# Patient Record
Sex: FEMALE | Race: AMERICAN INDIAN OR ALASKA NATIVE
[De-identification: names, ages, dates, MRNs, and addresses within clinical notes are randomized per-mention and may not be internally consistent; named-entity substitution may affect disease eponyms.]

---

## 2021-10-21 ENCOUNTER — HOSPITAL ENCOUNTER (EMERGENCY)
Dept: HOSPITAL 5 - ED | Age: 31
Discharge: HOME | End: 2021-10-21
Payer: COMMERCIAL

## 2021-10-21 VITALS — SYSTOLIC BLOOD PRESSURE: 133 MMHG | DIASTOLIC BLOOD PRESSURE: 75 MMHG

## 2021-10-21 DIAGNOSIS — Z98.890: ICD-10-CM

## 2021-10-21 DIAGNOSIS — Z3A.10: ICD-10-CM

## 2021-10-21 DIAGNOSIS — O23.41: ICD-10-CM

## 2021-10-21 DIAGNOSIS — O20.9: Primary | ICD-10-CM

## 2021-10-21 LAB
ALBUMIN SERPL-MCNC: 4.1 G/DL (ref 3.9–5)
ALT SERPL-CCNC: 9 UNITS/L (ref 7–56)
BACTERIA #/AREA URNS HPF: (no result) /HPF
BASOPHILS # (AUTO): 0 K/MM3 (ref 0–0.1)
BASOPHILS NFR BLD AUTO: 0.4 % (ref 0–1.8)
BILIRUB UR QL STRIP: (no result)
BLOOD UR QL VISUAL: (no result)
BUN SERPL-MCNC: 5 MG/DL (ref 7–17)
BUN/CREAT SERPL: 8 %
CALCIUM SERPL-MCNC: 9.4 MG/DL (ref 8.4–10.2)
EOSINOPHIL # BLD AUTO: 0.1 K/MM3 (ref 0–0.4)
EOSINOPHIL NFR BLD AUTO: 1.6 % (ref 0–4.3)
HCT VFR BLD CALC: 37.2 % (ref 30.3–42.9)
HEMOLYSIS INDEX: 5
HGB BLD-MCNC: 12.7 GM/DL (ref 10.1–14.3)
LYMPHOCYTES # BLD AUTO: 1.6 K/MM3 (ref 1.2–5.4)
LYMPHOCYTES NFR BLD AUTO: 19.9 % (ref 13.4–35)
MCHC RBC AUTO-ENTMCNC: 34 % (ref 30–34)
MCV RBC AUTO: 84 FL (ref 79–97)
MONOCYTES # (AUTO): 0.7 K/MM3 (ref 0–0.8)
MONOCYTES % (AUTO): 8.6 % (ref 0–7.3)
MUCOUS THREADS #/AREA URNS HPF: (no result) /HPF
PH UR STRIP: 5 [PH] (ref 5–7)
PLATELET # BLD: 351 K/MM3 (ref 140–440)
RBC # BLD AUTO: 4.44 M/MM3 (ref 3.65–5.03)
RBC #/AREA URNS HPF: 6 /HPF (ref 0–6)
UROBILINOGEN UR-MCNC: 4 MG/DL (ref ?–2)
WBC #/AREA URNS HPF: 27 /HPF (ref 0–6)

## 2021-10-21 PROCEDURE — 86901 BLOOD TYPING SEROLOGIC RH(D): CPT

## 2021-10-21 PROCEDURE — 84702 CHORIONIC GONADOTROPIN TEST: CPT

## 2021-10-21 PROCEDURE — 76801 OB US < 14 WKS SINGLE FETUS: CPT

## 2021-10-21 PROCEDURE — 81001 URINALYSIS AUTO W/SCOPE: CPT

## 2021-10-21 PROCEDURE — 36415 COLL VENOUS BLD VENIPUNCTURE: CPT

## 2021-10-21 PROCEDURE — 85025 COMPLETE CBC W/AUTO DIFF WBC: CPT

## 2021-10-21 PROCEDURE — 87086 URINE CULTURE/COLONY COUNT: CPT

## 2021-10-21 PROCEDURE — 99284 EMERGENCY DEPT VISIT MOD MDM: CPT

## 2021-10-21 PROCEDURE — 86900 BLOOD TYPING SEROLOGIC ABO: CPT

## 2021-10-21 PROCEDURE — 80053 COMPREHEN METABOLIC PANEL: CPT

## 2021-10-21 NOTE — EMERGENCY DEPARTMENT REPORT
ED General Adult HPI





- General


Chief complaint: Vaginal Bleeding


Stated complaint: 10WKS PREGNANT BLEEEDING


Time Seen by Provider: 10/21/21 10:46


Source: patient


Mode of arrival: Ambulatory


Limitations: No Limitations





- History of Present Illness


Initial comments: 





31-year-old -American female patient presents with bleeding during 

pregnancy today.  She states she noticed vaginal bleeding around 2 AM this 

morning that was very light and spotty.  She has not used a full pad today.  She

denies any abdominal pain, dysuria/hematuria/urinary frequency, vaginal 

discharge/dyspareunia, fever/chills/sweats, or stool changes.  She is G2, .

 She reports her first OB/GYN appointment is Wednesday next week.  Patient 

states she is otherwise feeling well.  No other past medical history.





- Related Data


                                Home Medications











 Medication  Instructions  Recorded  Confirmed  Last Taken


 


Prenatal Vit-Fe Fumar-FA [Prenatal 1 tab PO QDAY 21 Unknown





Vitamin]    








                                  Previous Rx's











 Medication  Instructions  Recorded  Last Taken  Type


 


HYDROcodone/APAP 5-325 [[place] 1 each PO Q6HR PRN #15 tablet 21 Unknown Rx





5/325]    


 


Ibuprofen [Motrin] 800 mg PO Q8HR PRN #40 tablet 21 Unknown Rx


 


Amoxicillin/Potassium Clav 1 each PO BID 5 Days #10 tablet 10/21/21 Unknown Rx





[Augmentin 875-125 Tablet]    











                                    Allergies











Allergy/AdvReac Type Severity Reaction Status Date / Time


 


No Known Allergies Allergy   Verified 10/21/21 10:27














ED Review of Systems


ROS: 


Stated complaint: 10WKS PREGNANT BLEEEDING


Other details as noted in HPI





Constitutional: denies: chills, fever, malaise


Respiratory: denies: shortness of breath


Cardiovascular: denies: chest pain


Gastrointestinal: denies: abdominal pain, nausea, vomiting


Genitourinary: abnormal menses.  denies: urgency, dysuria, frequency, hematuria,

discharge, dyspareunia


Skin: denies: lesions


Neurological: denies: headache


Hematological/Lymphatic: denies: swollen glands





ED Past Medical Hx





- Past Medical History


Previous Medical History?: No


Hx Hypertension: No


Hx Liver Disease: No


Hx Renal Disease: No





- Surgical History


Additional Surgical History: BREAST REDUCTION





- Social History


Smoking Status: Never Smoker





- Medications


Home Medications: 


                                Home Medications











 Medication  Instructions  Recorded  Confirmed  Last Taken  Type


 


HYDROcodone/APAP 5-325 [[place] 1 each PO Q6HR PRN #15 tablet 21  Unknown Rx





5/325]     


 


Ibuprofen [Motrin] 800 mg PO Q8HR PRN #40 tablet 21  Unknown Rx


 


Prenatal Vit-Fe Fumar-FA [Prenatal 1 tab PO QDAY 21 Unknown 

History





Vitamin]     


 


Amoxicillin/Potassium Clav 1 each PO BID 5 Days #10 tablet 10/21/21  Unknown Rx





[Augmentin 875-125 Tablet]     














ED Physical Exam





- General


Limitations: No Limitations


General appearance: alert, in no apparent distress





- Head


Head exam: Present: atraumatic, normocephalic





- Eye


Eye exam: Present: normal appearance.  Absent: scleral icterus





- Respiratory


Respiratory exam: Present: normal lung sounds bilaterally.  Absent: respiratory 

distress





- Cardiovascular


Cardiovascular Exam: Present: regular rate





- GI/Abdominal


GI/Abdominal exam: Present: soft, normal bowel sounds.  Absent: distended, 

tenderness, guarding, rebound, rigid





- Neurological Exam


Neurological exam: Present: alert, oriented X3





- Psychiatric


Psychiatric exam: Present: normal affect, normal mood





- Skin


Skin exam: Present: warm, dry, intact, normal color.  Absent: rash





ED Course


                                   Vital Signs











  10/21/21





  10:31


 


Temperature 98.6 F


 


Pulse Rate 95 H


 


Respiratory 20





Rate 


 


Blood Pressure 123/85


 


O2 Sat by Pulse 97





Oximetry 














ED Medical Decision Making





- Lab Data


Result diagrams: 


                                 10/21/21 10:59





                                 10/21/21 10:59





- Radiology Data


Radiology results: report reviewed





ULTRASOUND OBSTETRIC   


 


 INDICATION / CLINICAL INFORMATION: bleeding preg.  


 Clinical Gestational Age (GA) in weeks, days: 10, 6   


 


 TECHNIQUE: Transabdominal.  


 


 COMPARISON: None available.  


 


 FINDINGS:  


 GESTATIONAL SAC: Well-defined oval shape and intrauterine in location.   


 YOLK SAC: Not seen  


 


 EMBRYO/FETUS: No significant abnormality.   


 - Crown-Rump Length = 3.5 cm = 10, 3 weeks, days  


 - Fetal Heart Rate, beats per minute (if present) = 176  


 


 ADNEXA: There is a 2.4 cm cyst in the left ovary.  


 FREE FLUID: None.  


 


 ADDITIONAL FINDINGS: None.  


 


 IMPRESSION:  


 1. Single, living intrauterine pregnancy with estimated sonographic age of 10, 

3 weeks, days.  





- Medical Decision Making








31-year-old -American female patient presents with bleeding during 

pregnancy today.  She states she noticed vaginal bleeding around 2 AM this 

morning that was very light and spotty.  She has not used a full pad today.  She

denies any abdominal pain, dysuria/hematuria/urinary frequency, vaginal 

discharge/dyspareunia, fever/chills/sweats, or stool changes.  She is G2, .

 She reports her first OB/GYN appointment is Wednesday next week.  Patient 

states she is otherwise feeling well.  No other past medical history.





Normal IUP at 10 weeks 3 days noted on ultrasound.  UA shows UTI.  Augmentin 

given for infection.  Patient is well-appearing, her vitals are normal, she is 

stable for discharge home.  Recommend follow-up with OB/GYN as scheduled next 

week.  Strict return precautions were discussed in detail with patient 

verbalized understanding.


Critical care attestation.: 


If time is entered above; I have spent that time in minutes in the direct care 

of this critically ill patient, excluding procedure time.








ED Disposition


Clinical Impression: 


 Vaginal bleeding during pregnancy, UTI in pregnancy





Disposition:  HOME / SELF CARE / HOMELESS


Is pt being admited?: No


Condition: Stable


Additional Instructions: 


Follow-up with your OB/GYN as scheduled


Prescriptions: 


Amoxicillin/Potassium Clav [Augmentin 875-125 Tablet] 1 each PO BID 5 Days #10 

tablet

## 2021-10-21 NOTE — ULTRASOUND REPORT
ULTRASOUND OBSTETRIC 



INDICATION / CLINICAL INFORMATION: bleeding preg.

Clinical Gestational Age (GA) in weeks, days: 10, 6 



TECHNIQUE: Transabdominal.



COMPARISON: None available.



FINDINGS:

GESTATIONAL SAC: Well-defined oval shape and intrauterine in location. 

YOLK SAC: Not seen



EMBRYO/FETUS: No significant abnormality. 

- Crown-Rump Length = 3.5 cm = 10, 3 weeks, days

- Fetal Heart Rate, beats per minute (if present) = 176



ADNEXA: There is a 2.4 cm cyst in the left ovary.

FREE FLUID: None.



ADDITIONAL FINDINGS: None.



IMPRESSION:

1. Single, living intrauterine pregnancy with estimated sonographic age of 10, 3 weeks, days.



Signer Name: Yadiel Smith MD 

Signed: 10/21/2021 1:29 PM

Workstation Name: [id number]

## 2022-05-13 ENCOUNTER — HOSPITAL ENCOUNTER (OUTPATIENT)
Dept: HOSPITAL 5 - TRG | Age: 32
LOS: 1 days | Discharge: HOME | End: 2022-05-14
Attending: OBSTETRICS & GYNECOLOGY
Payer: COMMERCIAL

## 2022-05-13 DIAGNOSIS — Z3A.40: ICD-10-CM

## 2022-05-13 DIAGNOSIS — Z34.93: Primary | ICD-10-CM

## 2022-05-13 PROCEDURE — 59025 FETAL NON-STRESS TEST: CPT

## 2022-05-14 VITALS — DIASTOLIC BLOOD PRESSURE: 77 MMHG | SYSTOLIC BLOOD PRESSURE: 120 MMHG

## 2022-05-15 ENCOUNTER — HOSPITAL ENCOUNTER (OUTPATIENT)
Dept: HOSPITAL 5 - TRG | Age: 32
LOS: 1 days | Discharge: HOME | End: 2022-05-16
Attending: OBSTETRICS & GYNECOLOGY
Payer: COMMERCIAL

## 2022-05-15 VITALS — DIASTOLIC BLOOD PRESSURE: 65 MMHG | SYSTOLIC BLOOD PRESSURE: 115 MMHG

## 2022-05-15 DIAGNOSIS — O42.90: ICD-10-CM

## 2022-05-15 DIAGNOSIS — Z3A.00: ICD-10-CM

## 2022-05-15 PROCEDURE — 59025 FETAL NON-STRESS TEST: CPT

## 2022-05-17 ENCOUNTER — HOSPITAL ENCOUNTER (INPATIENT)
Dept: HOSPITAL 5 - TRG | Age: 32
LOS: 3 days | Discharge: HOME | End: 2022-05-20
Attending: OBSTETRICS & GYNECOLOGY | Admitting: OBSTETRICS & GYNECOLOGY
Payer: COMMERCIAL

## 2022-05-17 DIAGNOSIS — Z3A.40: ICD-10-CM

## 2022-05-17 DIAGNOSIS — Z20.822: ICD-10-CM

## 2022-05-17 LAB
HCT VFR BLD CALC: 30.7 % (ref 30.3–42.9)
HGB BLD-MCNC: 10.3 GM/DL (ref 10.1–14.3)
MCHC RBC AUTO-ENTMCNC: 34 % (ref 30–34)
MCV RBC AUTO: 80 FL (ref 79–97)
PLATELET # BLD: 263 K/MM3 (ref 140–440)
RBC # BLD AUTO: 3.84 M/MM3 (ref 3.65–5.03)

## 2022-05-17 PROCEDURE — 86850 RBC ANTIBODY SCREEN: CPT

## 2022-05-17 PROCEDURE — 85014 HEMATOCRIT: CPT

## 2022-05-17 PROCEDURE — U0003 INFECTIOUS AGENT DETECTION BY NUCLEIC ACID (DNA OR RNA); SEVERE ACUTE RESPIRATORY SYNDROME CORONAVIRUS 2 (SARS-COV-2) (CORONAVIRUS DISEASE [COVID-19]), AMPLIFIED PROBE TECHNIQUE, MAKING USE OF HIGH THROUGHPUT TECHNOLOGIES AS DESCRIBED BY CMS-2020-01-R: HCPCS

## 2022-05-17 PROCEDURE — 86901 BLOOD TYPING SEROLOGIC RH(D): CPT

## 2022-05-17 PROCEDURE — 59025 FETAL NON-STRESS TEST: CPT

## 2022-05-17 PROCEDURE — 86900 BLOOD TYPING SEROLOGIC ABO: CPT

## 2022-05-17 PROCEDURE — G0463 HOSPITAL OUTPT CLINIC VISIT: HCPCS

## 2022-05-17 PROCEDURE — 85018 HEMOGLOBIN: CPT

## 2022-05-17 PROCEDURE — 85027 COMPLETE CBC AUTOMATED: CPT

## 2022-05-17 PROCEDURE — 99211 OFF/OP EST MAY X REQ PHY/QHP: CPT

## 2022-05-17 PROCEDURE — 36415 COLL VENOUS BLD VENIPUNCTURE: CPT

## 2022-05-17 PROCEDURE — 86592 SYPHILIS TEST NON-TREP QUAL: CPT

## 2022-05-18 PROCEDURE — 0KQM0ZZ REPAIR PERINEUM MUSCLE, OPEN APPROACH: ICD-10-PCS | Performed by: OBSTETRICS & GYNECOLOGY

## 2022-05-18 PROCEDURE — 00HU33Z INSERTION OF INFUSION DEVICE INTO SPINAL CANAL, PERCUTANEOUS APPROACH: ICD-10-PCS | Performed by: OBSTETRICS & GYNECOLOGY

## 2022-05-18 PROCEDURE — 3E0R3BZ INTRODUCTION OF ANESTHETIC AGENT INTO SPINAL CANAL, PERCUTANEOUS APPROACH: ICD-10-PCS | Performed by: OBSTETRICS & GYNECOLOGY

## 2022-05-18 RX ADMIN — FENTANYL CITRATE SCH MLS/HR: 50 INJECTION, SOLUTION INTRAMUSCULAR; INTRAVENOUS at 00:51

## 2022-05-18 RX ADMIN — AMPICILLIN SODIUM SCH MLS/HR: 1 INJECTION, POWDER, FOR SOLUTION INTRAMUSCULAR; INTRAVENOUS at 05:51

## 2022-05-18 RX ADMIN — EPHEDRINE SULFATE PRN MG: 50 INJECTION INTRAVENOUS at 00:57

## 2022-05-18 RX ADMIN — IBUPROFEN SCH MG: 800 TABLET, FILM COATED ORAL at 23:27

## 2022-05-18 RX ADMIN — IBUPROFEN SCH MG: 800 TABLET, FILM COATED ORAL at 17:28

## 2022-05-18 RX ADMIN — FENTANYL CITRATE SCH MLS/HR: 50 INJECTION, SOLUTION INTRAMUSCULAR; INTRAVENOUS at 11:07

## 2022-05-18 RX ADMIN — DOCUSATE SODIUM SCH MG: 100 CAPSULE, LIQUID FILLED ORAL at 23:27

## 2022-05-18 RX ADMIN — AMPICILLIN SODIUM SCH MLS/HR: 1 INJECTION, POWDER, FOR SOLUTION INTRAMUSCULAR; INTRAVENOUS at 01:32

## 2022-05-18 RX ADMIN — EPHEDRINE SULFATE PRN MG: 50 INJECTION INTRAVENOUS at 01:05

## 2022-05-18 NOTE — ANESTHESIA CONSULTATION
Anesthesia Consult and Med Hx


Date of service: 05/18/22





- Airway


Anesthetic Teeth Evaluation: Good


ROM Head & Neck: Adequate


Mental/Hyoid Distance: Adequate


Mallampati Class: Class II


Intubation Access Assessment: Probably Good





- Pulmonary Exam


CTA: Yes





- Cardiac Exam


Cardiac Exam: RRR





- Pre-Operative Health Status


ASA Pre-Surgery Classification: ASA2


Proposed Anesthetic Plan: Epidural





- Pulmonary


Hx Smoking: No


Hx Asthma: No


Hx Respiratory Symptoms: No


Hx Sleep Apnea: No





- Cardiovascular System


Hx Hypertension: No


Hx Heart Attack/AMI: No


Hx Angina: No





- Central Nervous System


Hx Seizures: No


CVA: No


Hx Psychiatric Problems: No





- Gastrointestinal


Hx Gastroesophageal Reflux Disease: No





- Endocrine


Hx Renal Disease: No


Hx Liver Disease: No


Hx Insulin Dependent Diabetes: No


Hx Non-Insulin Dependent Diabetes: No


Hx Thyroid Disease: No


Hx Hypothyroidism: No


Hx Hyperthyroidism: No





- Hematic


Hx Anemia: No


Hx Sickle Cell Disease: No





- Other Systems


Hx Alcohol Use: No


Hx Obesity: Yes

## 2022-05-18 NOTE — PROCEDURE NOTE
OB Delivery Note





- Delivery


Date of Delivery: 22


Surgeon: MADELYN SAVAGE


Estimated blood loss: 300cc





- Vaginal


Delivery presentation: vertex


Delivery position: OA


Intrapartum events: none


Delivery induction: none


Delivery augmentation: pitocin


Delivery monitor: external FHT, external uterine


Route of delivery: 


Delivery placenta: spontaneous


Delivery cord: 3 umbilical vessels


Episiotomy: none


Delivery laceration: 2nd degree


Delivery repair: vicryl


Anesthesia: epidural





- Infant


  ** A


Apgar at 1 minute: 9


Apgar at 5 minutes: 9


Infant Gender: Male (6 pounds 14 ounces)

## 2022-05-18 NOTE — HISTORY AND PHYSICAL REPORT
History of Present Illness


Date of examination: 22


Date of admission: 


22 17:11





Chief complaint: 





I'm here for induction


History of present illness: 





Pt is a 31 year old  who presents for induction of labor at 40.4 weeks 

gestation. Pt has had an uncomplicated prenatal course and 3 days of prodromal 

labor.





Past History


Past Medical History: no pertinent history


Past Surgical History: no surgical history


Family/Genetic History: none


Social history: 





- Obstetrical History


Expected Date of Delivery: 22


Actual Gestation: 40 Week(s) 5 Day(s) 


: 2





Medications and Allergies


                                    Allergies











Allergy/AdvReac Type Severity Reaction Status Date / Time


 


No Known Allergies Allergy   Verified 10/21/21 10:27











                                Home Medications











 Medication  Instructions  Recorded  Confirmed  Last Taken  Type


 


HYDROcodone/APAP 5-325 [[place] 1 each PO Q6HR PRN #15 tablet 21  Unknown Rx





5/325]     


 


Ibuprofen [Motrin] 800 mg PO Q8HR PRN #40 tablet 21  Unknown Rx


 


Prenatal Vit-Fe Fumar-FA [Prenatal 1 tab PO QDAY 21 Unknown 

History





Vitamin]     


 


Amoxicillin/Potassium Clav 1 each PO BID 5 Days #10 tablet 10/21/21  Unknown Rx





[Augmentin 875-125 Tablet]     











Active Meds: 


Active Medications





Acetaminophen (Acetaminophen 325 Mg Tab)  650 mg PO Q4H PRN


   PRN Reason: Pain, Mild (1-3)


   Last Admin: 22 20:32 Dose:  650 mg


   


Butorphanol Tartrate (Butorphanol 2 Mg/1 Ml Inj)  2 mg IV Q2H PRN


   PRN Reason: Pain , Severe (7-10)


   Last Admin: 22 20:34 Dose:  2 mg


   


Carboprost Tromethamine (Carboprost Tromethamine 250 Mcg/1 Ml Inj)  250 mcg IM 

ONCE PRN


   PRN Reason: Uterine Bleeding


Diphenhydramine HCl (Diphenhydramine 50 Mg/Ml Vial)  12.5 mg IV Q2H PRN


   PRN Reason: Itching


Ephedrine Sulfate (Ephedrine Sulfate 50 Mg/1 Ml Inj)  10 mg IV Q2M PRN


   PRN Reason: Hypotension


   Last Admin: 22 01:05 Dose:  10 mg


   


Fentanyl (Fentanyl 100 Mcg/2 Ml Inj)  100 mcg IV Q2H PRN


   PRN Reason: Pain,Severe (7-10) LABOR PAIN


Oxytocin/Sodium Chloride (Pitocin/Ns 30 Unit/500ml)  30 units in 500 mls @ 4 

mls/hr IV TITR GHAZAL; Protocol


   Last Titration: 22 05:50 Dose:  4 mls/hr, 4 mls/hr


   


Lactated Ringer's (Lactated Ringers)  1,000 mls @ 125 mls/hr IV AS DIRECT GHAZAL


Ampicillin Sodium (Ampicillin/Ns 1 Gm/50 Ml)  1 gm in 50 mls @ 100 mls/hr IV Q4H

GHAZAL; Protocol


   Last Admin: 22 05:51 Dose:  100 mls/hr


   


Fentanyl/Bupivacaine/Sodium Chlor (Fentanyl-Bupiv 2 Mcg/Ml-0.125%)  200 mcg in 

100 mls @ 12 mls/hr EPIDURAL TITR GHAZAL; Protocol


   Last Admin: 22 00:51 Dose:  12 mls/hr


   


Loperamide HCl (Loperamide 2 Mg Cap)  2 mg PO ONCE PRN


   PRN Reason: give with Hemabate


Methylergonovine Maleate (Methylergonovine Maleate 0.2 Mg/Ml Vial)  0.2 mg IM 

ONCE PRN


   PRN Reason: Uterine Bleeding


Mineral Oil (Mineral Oil 30 Ml Oral Liqd)  30 ml PO QHS PRN


   PRN Reason: Constipation


Misoprostol (Misoprostol 200 Mcg Tab)  800 mcg LA ONCE PRN


   PRN Reason: Uterine Bleeding


Nalbuphine HCl (Nalbuphine 10 Mg/1 Ml Inj)  2.5 mg IV Q2H PRN


   PRN Reason: Itching


Naloxone HCl (Naloxone 2 Mg/2 Ml Inj)  0.2 mg IV Q5M PRN


   PRN Reason: Respiratory sedation


Ondansetron HCl (Ondansetron 4 Mg/2 Ml Inj)  4 mg IV Q8H PRN


   PRN Reason: Nausea And Vomiting


Oxytocin (Oxytocin 10 Unit/1 Ml Inj)  10 unit IM ONCE PRN


   PRN Reason: Uterine Bleeding


Terbutaline Sulfate (Terbutaline 1 Mg/1 Ml Inj)  0.25 mg SUB-Q ONCE PRN


   PRN Reason: Hyperstimulation/Hypertonicity











Review of Systems


All systems: negative


Constitutional: weight gain, malaise


Gastrointestinal: abdominal pain


Genitourinary: pelvic pain, contractions





- Vital Signs


Vital signs: 


                                   Vital Signs











Pulse BP Pulse Ox


 


 110 H  113/78   97 


 


 05/17/22 17:52  22 17:52  22 17:52








                                        











Temp Pulse Resp BP Pulse Ox


 


 98.5 F   84   16   127/88   100 


 


 22 07:32  22 08:30  22 07:32  22 08:25  22 08:30














- Physical Exam


Breasts: 


Cardiovascular: Regular rate, Normal S1, Normal S2


Lungs: Positive: Clear to auscultation, Normal air movement


Abdomen: Positive: normal appearance, soft, normal bowel sounds.  Negative: 

distention, tenderness


Genitourinary (Female): Positive: normal external genitalia, normal perenium


Vulva: both: normal


Vagina: Positive: normal moisture.  Negative: discharge


Cervix: Negative: lesion, discharge


Uterus: Positive: normal size, normal contour


Adnexa: both: normal


Anus/Rectum: Positive: normal perianal skin, heme negative.  Negative: rectal 

mass, hemorrhoids


Extremities: 


Deep Tendon Reflex Grade: Normal +2





- Obstetrical


FHR: auscultation normal


Cervical Dilatation: 3


Cervical Effacement Percentage: 90


Fetal station: -2


Uterine Contraction Pattern: Regular


Uterine Tone Measurement Phase: Contraction


Uterine Contraction Intensity: Moderate





Results


Result Diagrams: 


                                 22 18:00





                              Abnormal lab results











  22 Range/Units





  18:00 


 


MCH  27 L  (28-32)  pg








All other labs normal.








Assessment and Plan





IUP at 40.5 weeks here for labor induction. Admit for pitocin augmentation. A

nticipate . Will treat for GBS positive status.

## 2022-05-18 NOTE — PROGRESS NOTE
Labor Epidural





- Labor Epidural


Start Time: 00:30


Stop Time: 00:40


Performed by:: LENY FOUNTAIN


Procedure: 





Patient is requesting epidural for labor and pain.  H&P, labs were reviewed.   

Patient IDed, all questions and concerns were answered, and consent was signed. 

Timeout was performed at bedside.  Patient in sitting position.  Sterile prep 

and drape was performed.  3ml of 1% lidocaine skin wheal at L[3]- L [4].  17-

gauge Tuohy epidural needle was advanced to loss of resistance with air 

technique 8cm.  Negative CSF negative blood.  Epidural catheter advanced to [14]

 centimeters.  [negative] Aspiration [negative] test dose.  Sterile dressing 

applied.  Patient tolerated procedure.

## 2022-05-19 LAB
HCT VFR BLD CALC: 27.1 % (ref 30.3–42.9)
HGB BLD-MCNC: 9.1 GM/DL (ref 10.1–14.3)

## 2022-05-19 RX ADMIN — IBUPROFEN SCH MG: 800 TABLET, FILM COATED ORAL at 05:08

## 2022-05-19 RX ADMIN — IBUPROFEN SCH MG: 800 TABLET, FILM COATED ORAL at 18:22

## 2022-05-19 RX ADMIN — IBUPROFEN SCH MG: 800 TABLET, FILM COATED ORAL at 12:18

## 2022-05-19 RX ADMIN — DOCUSATE SODIUM SCH MG: 100 CAPSULE, LIQUID FILLED ORAL at 10:18

## 2022-05-19 RX ADMIN — DOCUSATE SODIUM SCH MG: 100 CAPSULE, LIQUID FILLED ORAL at 22:25

## 2022-05-19 NOTE — DISCHARGE SUMMARY
Providers





- Providers


Date of Admission: 


22 17:11





Date of discharge: 22


Attending physician: 


MADELYN SAVAGE





Primary care physician: 


MADELYN SAVAGE








Hospitalization


Reason for admission: induction of labor


Delivery: 


Episiotomy: none


Laceration: 2nd degree


Other postpartum procedures: none


Discharge diagnosis: IUP at term delivered


 baby: male


Hospital course: 





unremarkable


Condition at discharge: Good


Disposition: 01 HOME / SELF CARE / HOMELESS





Plan





- Discharge Medications


Prescriptions: 


Ibuprofen [Motrin 800 MG tab] 800 mg PO Q6H #40 tablet





- Provider Discharge Summary


Activity: routine, no sex for 6 weeks, no heavy lifting 4 weeks, no strenuous 

exercise


Diet: routine


Instructions: routine


Additional instructions: 


[]  Smoking cessation referral if applicable(refer to patient education folder 

for contact #)


[]  Refer to [facility] Booklet








Call your doctor immediately for:


* Fever > 100.5


* Heavy vaginal bleeding ( >1 pad per hour)


* Severe persistent headache


* Shortness of breath


* Reddened, hot, painful area to leg or breast


* Drainage or odor from incision.





* Keep incision clean and dry at all times and follow doctor's instructions 

regarding bathing/showering











- Follow up plan


Follow up: 


MADELYN SAVAGE MD [Primary Care Provider] - 6 Weeks

## 2022-05-19 NOTE — PROGRESS NOTE
Assessment and Plan





PPD 1 s/p . Doing well. Plan for discharge on today





Subjective





- Subjective


Date of service: 22


Principal diagnosis: spontaneous vaginal delivery


Interval history: 





Pt is a 31 year old  who presents for induction of labor at 40.4 weeks 

gestation. Pt has had an uncomplicated prenatal course and 3 days of prodromal 

labor.


Patient reports: appetite normal, voiding normally, pain well controlled


[place]: doing well





Objective





- Vital Signs


Latest vital signs: 


                                   Vital Signs











  Temp Pulse Resp BP BP Pulse Ox Pulse Ox


 


 22 08:16  98.1 F  84  18  115/83   97 


 


 22 05:06        98


 


 22 03:30        98


 


 22 01:15        98


 


 22 23:48  98.2 F  97 H  20  105/66   94 


 


 22 23:27        98


 


 22 22:15        98


 


 22 20:06  98.8 F  95 H  18  117/73   95 


 


 22 19:55        98


 


 22 15:27  98.3 F  79  18   120/77  100 


 


 22 15:00   91 H   127/75   100 


 


 22 14:55   87     100 


 


 22 14:54   90     85 


 


 22 14:50   110 H     99 


 


 22 14:45   97 H   128/75   100 


 


 22 14:40   85     100 


 


 22 14:35   96 H     100 


 


 22 14:30   103 H   135/89   99 


 


 22 14:25   103 H     100 


 


 22 14:22   111 H   121/69   


 


 22 14:21  99.2 F  101 H  20   121/69  100 


 


 22 14:20   104 H     100 


 


 22 14:15   95 H   145/62   100 


 


 22 14:10   131 H     100 


 


 22 14:05   103 H     100 


 


 22 14:00   125 H   139/68   98 


 


 22 13:55   100 H     100 


 


 22 13:50   108 H     100 


 


 22 13:45   109 H   116/65   100 


 


 22 13:40   120 H     100 


 


 22 13:35   112 H     100 


 


 22 13:30   112 H   122/78   100 


 


 22 13:25   110 H     100 


 


 22 13:20   96 H     100 


 


 22 13:15   153 H     99 


 


 22 13:10   127 H     99 


 


 22 13:05   89     100 


 


 22 13:00   139 H     100 


 


 22 12:55   84     99 


 


 22 12:50   123 H     100 


 


 22 12:45   81     100 


 


 22 12:40   141 H     100 


 


 22 12:35   86     100 


 


 22 12:30   96 H     99 


 


 22 12:25   96 H     100 














  Pulse Ox


 


 22 08:16 


 


 22 05:06  98


 


 22 03:30  98


 


 22 01:15  98


 


 22 23:48 


 


 22 23:27  98


 


 22 22:15  98


 


 22 20:06 


 


 22 19:55  98


 


 22 15:27  100


 


 22 15:00 


 


 22 14:55 


 


 22 14:54 


 


 22 14:50 


 


 22 14:45 


 


 22 14:40 


 


 22 14:35 


 


 22 14:30 


 


 22 14:25 


 


 22 14:22 


 


 22 14:21 


 


 22 14:20 


 


 22 14:15 


 


 22 14:10 


 


 22 14:05 


 


 22 14:00 


 


 22 13:55 


 


 22 13:50 


 


 22 13:45 


 


 22 13:40 


 


 22 13:35 


 


 22 13:30 


 


 22 13:25 


 


 22 13:20 


 


 22 13:15 


 


 22 13:10 


 


 22 13:05 


 


 22 13:00 


 


 22 12:55 


 


 22 12:50 


 


 22 12:45 


 


 22 12:40 


 


 22 12:35 


 


 22 12:30 


 


 22 12:25 








                                Intake and Output











 22





 22:59 06:59 14:59


 


Intake Total   120


 


Output Total 600  


 


Balance -600  120


 


Intake:   


 


  Oral   120


 


Output:   


 


  Urine 600  


 


    Void 600  


 


Other:   


 


  Total, Intake Amount   120


 


  Total, Output Amount 300  


 


  # Voids   


 


    Void 1  1














- Exam


Breasts: Present: deferred


Cardiovascular: Present: Regular rate, Normal S1, Normal S2


Lungs: Present: Clear to auscultation, Normal air movement


Abdomen: Present: normal appearance, soft, normal bowel sounds


Uterus: Present: normal, firm


Extremities: Present: normal





- Labs


Labs: 


                              Abnormal lab results











  22 Range/Units





  00:29 


 


Hgb  9.1 L  (10.1-14.3)  gm/dl


 


Hct  27.1 L  (30.3-42.9)  %

## 2022-05-20 VITALS — SYSTOLIC BLOOD PRESSURE: 103 MMHG | DIASTOLIC BLOOD PRESSURE: 69 MMHG
